# Patient Record
Sex: FEMALE | Race: WHITE | Employment: OTHER | URBAN - METROPOLITAN AREA
[De-identification: names, ages, dates, MRNs, and addresses within clinical notes are randomized per-mention and may not be internally consistent; named-entity substitution may affect disease eponyms.]

---

## 2017-01-01 ENCOUNTER — HOME CARE VISIT (OUTPATIENT)
Dept: HOSPICE | Facility: HOSPICE | Age: 82
End: 2017-01-01
Payer: MEDICARE

## 2017-01-01 ENCOUNTER — HOSPITAL ENCOUNTER (INPATIENT)
Age: 82
LOS: 1 days | DRG: 190 | End: 2017-02-05
Attending: INTERNAL MEDICINE | Admitting: INTERNAL MEDICINE
Payer: OTHER MISCELLANEOUS

## 2017-01-01 ENCOUNTER — HOME CARE VISIT (OUTPATIENT)
Dept: SCHEDULING | Facility: HOME HEALTH | Age: 82
End: 2017-01-01
Payer: MEDICARE

## 2017-01-01 VITALS
OXYGEN SATURATION: 96 % | HEART RATE: 90 BPM | DIASTOLIC BLOOD PRESSURE: 73 MMHG | RESPIRATION RATE: 24 BRPM | TEMPERATURE: 98.2 F | SYSTOLIC BLOOD PRESSURE: 139 MMHG

## 2017-01-01 VITALS
DIASTOLIC BLOOD PRESSURE: 60 MMHG | OXYGEN SATURATION: 96 % | SYSTOLIC BLOOD PRESSURE: 120 MMHG | HEART RATE: 76 BPM | RESPIRATION RATE: 26 BRPM

## 2017-01-01 VITALS
DIASTOLIC BLOOD PRESSURE: 74 MMHG | OXYGEN SATURATION: 98 % | RESPIRATION RATE: 32 BRPM | HEART RATE: 108 BPM | SYSTOLIC BLOOD PRESSURE: 114 MMHG

## 2017-01-01 VITALS
HEART RATE: 96 BPM | DIASTOLIC BLOOD PRESSURE: 80 MMHG | SYSTOLIC BLOOD PRESSURE: 140 MMHG | RESPIRATION RATE: 26 BRPM | OXYGEN SATURATION: 96 %

## 2017-01-01 VITALS — SYSTOLIC BLOOD PRESSURE: 138 MMHG | HEART RATE: 78 BPM | DIASTOLIC BLOOD PRESSURE: 72 MMHG | OXYGEN SATURATION: 98 %

## 2017-01-01 VITALS
RESPIRATION RATE: 28 BRPM | DIASTOLIC BLOOD PRESSURE: 72 MMHG | HEART RATE: 81 BPM | OXYGEN SATURATION: 98 % | SYSTOLIC BLOOD PRESSURE: 140 MMHG

## 2017-01-01 PROCEDURE — G0300 HHS/HOSPICE OF LPN EA 15 MIN: HCPCS

## 2017-01-01 PROCEDURE — 0651 HSPC ROUTINE HOME CARE

## 2017-01-01 PROCEDURE — 74011000250 HC RX REV CODE- 250: Performed by: INTERNAL MEDICINE

## 2017-01-01 PROCEDURE — 65270000029 HC RM PRIVATE

## 2017-01-01 PROCEDURE — 77030029684 HC NEB SM VOL KT MONA -A

## 2017-01-01 PROCEDURE — 74011250636 HC RX REV CODE- 250/636: Performed by: NURSE PRACTITIONER

## 2017-01-01 PROCEDURE — 74011250636 HC RX REV CODE- 250/636: Performed by: INTERNAL MEDICINE

## 2017-01-01 PROCEDURE — HOSPICE MEDICATION HC HH HOSPICE MEDICATION

## 2017-01-01 PROCEDURE — G0299 HHS/HOSPICE OF RN EA 15 MIN: HCPCS

## 2017-01-01 PROCEDURE — 74011000258 HC RX REV CODE- 258: Performed by: INTERNAL MEDICINE

## 2017-01-01 PROCEDURE — 0656 HSPC GENERAL INPATIENT

## 2017-01-01 PROCEDURE — 94640 AIRWAY INHALATION TREATMENT: CPT

## 2017-01-01 PROCEDURE — 77010033678 HC OXYGEN DAILY

## 2017-01-01 RX ORDER — LORAZEPAM 2 MG/ML
1 INJECTION INTRAMUSCULAR EVERY 6 HOURS
Status: DISCONTINUED | OUTPATIENT
Start: 2017-01-01 | End: 2017-02-06 | Stop reason: HOSPADM

## 2017-01-01 RX ORDER — LORAZEPAM 2 MG/ML
0.5 INJECTION INTRAMUSCULAR
Status: DISCONTINUED | OUTPATIENT
Start: 2017-01-01 | End: 2017-02-06 | Stop reason: HOSPADM

## 2017-01-01 RX ORDER — FUROSEMIDE 10 MG/ML
10 INJECTION INTRAMUSCULAR; INTRAVENOUS ONCE
Status: COMPLETED | OUTPATIENT
Start: 2017-01-01 | End: 2017-01-01

## 2017-01-01 RX ORDER — DEXTROSE MONOHYDRATE AND SODIUM CHLORIDE 5; .45 G/100ML; G/100ML
25 INJECTION, SOLUTION INTRAVENOUS CONTINUOUS
Status: DISCONTINUED | OUTPATIENT
Start: 2017-01-01 | End: 2017-02-06 | Stop reason: HOSPADM

## 2017-01-01 RX ORDER — MORPHINE SULFATE 2 MG/ML
2 INJECTION, SOLUTION INTRAMUSCULAR; INTRAVENOUS
Status: DISCONTINUED | OUTPATIENT
Start: 2017-01-01 | End: 2017-02-06 | Stop reason: HOSPADM

## 2017-01-01 RX ORDER — DEXAMETHASONE SODIUM PHOSPHATE 4 MG/ML
10 INJECTION, SOLUTION INTRA-ARTICULAR; INTRALESIONAL; INTRAMUSCULAR; INTRAVENOUS; SOFT TISSUE DAILY
Status: DISCONTINUED | OUTPATIENT
Start: 2017-01-01 | End: 2017-02-06 | Stop reason: HOSPADM

## 2017-01-01 RX ORDER — MORPHINE SULFATE 2 MG/ML
1 INJECTION, SOLUTION INTRAMUSCULAR; INTRAVENOUS EVERY 4 HOURS
Status: DISCONTINUED | OUTPATIENT
Start: 2017-01-01 | End: 2017-02-06 | Stop reason: HOSPADM

## 2017-01-01 RX ORDER — AMOXICILLIN 250 MG
2 CAPSULE ORAL
Status: DISCONTINUED | OUTPATIENT
Start: 2017-01-01 | End: 2017-02-06 | Stop reason: HOSPADM

## 2017-01-01 RX ORDER — ALBUTEROL SULFATE 0.83 MG/ML
2.5 SOLUTION RESPIRATORY (INHALATION)
Status: DISCONTINUED | OUTPATIENT
Start: 2017-01-01 | End: 2017-02-06 | Stop reason: HOSPADM

## 2017-01-01 RX ORDER — ONDANSETRON 2 MG/ML
4 INJECTION INTRAMUSCULAR; INTRAVENOUS
Status: DISCONTINUED | OUTPATIENT
Start: 2017-01-01 | End: 2017-02-06 | Stop reason: HOSPADM

## 2017-01-01 RX ADMIN — ALBUTEROL SULFATE 3 ML: 2.5 SOLUTION RESPIRATORY (INHALATION) at 11:00

## 2017-01-01 RX ADMIN — ALBUTEROL SULFATE 2.5 MG: 2.5 SOLUTION RESPIRATORY (INHALATION) at 03:00

## 2017-01-01 RX ADMIN — LORAZEPAM 0.5 MG: 2 INJECTION INTRAMUSCULAR; INTRAVENOUS at 07:10

## 2017-01-01 RX ADMIN — ALBUTEROL SULFATE 2.5 MG: 2.5 SOLUTION RESPIRATORY (INHALATION) at 20:41

## 2017-01-01 RX ADMIN — LORAZEPAM 0.5 MG: 2 INJECTION INTRAMUSCULAR; INTRAVENOUS at 11:17

## 2017-01-01 RX ADMIN — DEXTROSE MONOHYDRATE AND SODIUM CHLORIDE 25 ML/HR: 5; .45 INJECTION, SOLUTION INTRAVENOUS at 15:34

## 2017-01-01 RX ADMIN — DEXAMETHASONE SODIUM PHOSPHATE 10 MG: 4 INJECTION, SOLUTION INTRAMUSCULAR; INTRAVENOUS at 16:36

## 2017-01-01 RX ADMIN — DEXAMETHASONE SODIUM PHOSPHATE 10 MG: 4 INJECTION, SOLUTION INTRAMUSCULAR; INTRAVENOUS at 09:00

## 2017-01-01 RX ADMIN — ONDANSETRON 4 MG: 2 INJECTION INTRAMUSCULAR; INTRAVENOUS at 10:35

## 2017-01-01 RX ADMIN — FUROSEMIDE 10 MG: 10 INJECTION, SOLUTION INTRAMUSCULAR; INTRAVENOUS at 15:44

## 2017-01-01 RX ADMIN — LORAZEPAM 0.5 MG: 2 INJECTION INTRAMUSCULAR; INTRAVENOUS at 00:38

## 2017-01-01 RX ADMIN — ALBUTEROL SULFATE 2.5 MG: 2.5 SOLUTION RESPIRATORY (INHALATION) at 16:40

## 2017-01-01 RX ADMIN — ALBUTEROL SULFATE 3 ML: 2.5 SOLUTION RESPIRATORY (INHALATION) at 11:30

## 2017-01-01 RX ADMIN — ALBUTEROL SULFATE 2.5 MG: 2.5 SOLUTION RESPIRATORY (INHALATION) at 07:33

## 2017-01-01 RX ADMIN — LORAZEPAM 0.5 MG: 2 INJECTION INTRAMUSCULAR; INTRAVENOUS at 16:36

## 2017-01-01 RX ADMIN — ALBUTEROL SULFATE 2.5 MG: 2.5 SOLUTION RESPIRATORY (INHALATION) at 14:53

## 2017-01-01 RX ADMIN — LORAZEPAM 0.5 MG: 2 INJECTION INTRAMUSCULAR; INTRAVENOUS at 13:58

## 2017-01-01 RX ADMIN — LORAZEPAM 0.5 MG: 0.5 TABLET ORAL at 11:30

## 2017-01-01 RX ADMIN — LORAZEPAM 0.5 MG: 2 INJECTION INTRAMUSCULAR; INTRAVENOUS at 19:26

## 2017-01-01 RX ADMIN — Medication 1 MG: at 19:26

## 2017-01-01 RX ADMIN — Medication 2 MG: at 13:58

## 2017-02-04 PROBLEM — J44.9 END STAGE CHRONIC OBSTRUCTIVE PULMONARY DISEASE (HCC): Status: ACTIVE | Noted: 2017-01-01

## 2017-02-04 NOTE — PROGRESS NOTES
02/04/17 1444   Vital Signs   Temp 98.5 °F (36.9 °C)   Temp Source Axillary   Pulse (Heart Rate) (!) 106   Resp Rate 30   O2 Sat (%) 97 %   Level of Consciousness Alert   /86   MAP (Calculated) 103   BP 1 Method Automatic   BP 1 Location Left arm   BP Patient Position At rest   MEWS Score 4   Patient is hospice

## 2017-02-04 NOTE — HOSPICE
Pt has been a home hospice patient and is being admitted for uncontrolled symptoms as below     [place] [facility]  [phone]    GIP Daily Nursing Note   Patient Name: Ricardo Rogers  YOB: 1925  Age: 80 y.o. Date of Visit: 02/04/17  Facility of Care: [facility]  Patient Room: [facility] [place]     Hospice Attending: Mariana Kulkarni MD  Hospice Diagnosis: COPD   End stage chronic obstructive pulmonary disease ([facility]    Level of Care: GIP  GIP Symptoms: Uncontrolled dyspnea/pulmonary congestion    Current GIP Symptoms    1. RR 32 when awake and 16 when sleeping  Use of all accessory muscles with shoulders coming up to ears during inspiration when awake  Respirations easy when sleeping    2. Irritating congested  NPC   IV Fluids D5 1/2 NS at 25 ml / hr   Decadron 10 mg IV daily   Lasix 10 mg daily  Albuterol nebulizer tx q 6 hours  3. Anxiety related to the above  Lorazepam 0.5 mg IV q 1 hr prn  4. Dyspnea   O2 3lpm nc continuously and BiPap prn          ASSESSMENT & PLAN   Must update Plan of Care including visit frequencies for IDT members  1. See above         Spiritual Interventions: None needed at this time    Psych/ Social/ Emotional Interventions: Support given to PCG Jenni Burnham and tc to [email or web address] at [phone] to advise of above    Care Coordination Needs: TC made to MD on call for Dr. Zofia Camarillo to advise of change in condition and order received to admit to [facility] inpatient hospice level of care  Coordinated and received orders from Dr. Bradley Alanis and Barry N Ivana Pederson and New Orders discussed / approved with above    Description History and Chart Review   If this is initial GIP note must document RN assessment/MD communication in previous setting.  Specifically document nursing/medication needs in last 24 hours to support GIP care  Narrative History of last 24 hours that demonstrates care cannot be provided in another setting: Patient with increased respiratory distress yesterday following 3 days of symptoms of virus with  nausea/emesis and diarrhea    Last loose stool and emesis was 2/3 at 1000. Increased respiratory distress with SN visits yesterday to manage symptoms. Morphine and Lorazepam had been given. This RN visited home this am at 21  as caregiver reported marked lethargy with continued shortness of breath. Pt received lethargic, responsive, /74  P 108  RR 32  O2 Sat 98% on O2 at 3lpm nc. Pulmonary congestion audible at the bedside with irritating NPC. Rales audible in bases. Bowel sounds hyperactive, pedal pulses good, legs distal to knees discolored due to cardiac disease. Legs hanging off side of bed. Repositioned in bed, Albuterol nebulizer tx given, Lorazepam 0.5 mg given sl. Pt went to sleep with respirations easy when sleeping on BiPap and yet when aroused, increased respiratory effort and said she was very short of breath. Pt too lethargic to administer PO meds she had been taking such as Prednisone Furosemide, and Coreg  Discussed with JIMENEZ Flores and order received to admit to [facility] inpatient hospice level of care as patient requiring IV fluids and IV meds of Decadron and Furosemide. What has been done to control the patient's symptoms in the last 24 hours? Morphine 40 mg liquid, Lorazepam 2 mg liquid, Albuterol nebulizer tx x 3, BiPap at times as tolerated    Does the patient currently require IV medications? Yes  Does the patient currently require scheduled medications? No  Does the patient currently require a PCA?  No    List number of doses of PRN medications in last 24 hours:  Medication 1:  Morphine 20 mg po  Number of doses:  2    Medication 2: Lorazepam 0.5 mg po  Number of doses: 4    Medication 3:   Albuterol tx   Number of doses:  3    Supporting documentation for GIP need for pain control:  [x] Frequent evaluation by a doctor, nurse practitioner, nurse   [x] Frequent medication adjustment [x] IVs that cannot be administered at home   [] Aggressive pain management   [x] Complicated technical delivery of medications              Supporting documentation for GIP need for symptom control:  [x]  Sudden decline necessitating intensive nursing intervention  []  Uncontrolled / intractable nausea or vomiting   []  Pathological fractures  []  Advanced open wounds requiring frequent skilled care  [x] Unmanageable respiratory distress  [] New or worsening delirium   [] Delirium with behavior issues: Is 24 hour caregiver present due to safety concerns with agitation? (yes/no)  [] Imminent death  with skilled nursing needs documented above    DISCHARGE PLANNING   Daily discharge planning required for GIP  1. Discharge Plan: Home with PCG   2. Patient/Family teaching: Review of Discharge planning including medications  3.  Response to patient/family teaching: MPOA and PCG Arvel Bound understand patient will be returned home after hospital stay    ASSESSMENT    KARNOFSKY: 30    Prognosis estimated based on 02/04/17 clinical assessment is:   [] Few to Many Hours  [] Hours to Days   [] Few to Many Days   [] Days to Weeks   [] Few to Many Weeks   [] Weeks to Months   [] Few to Many Months    Quality Measure: Patient self-reports:  [x] Yes    [] No    ESAS:   Time of Assessment: 2:30 pm  Pain (1-10):0 per patient  Fatigue (1-10):   Shortness of breath (1-10):6  Constipation: _ Yes  x_ No  LAST BM: 2/3/17    CLINICAL INFORMATION   Patient Vitals for the past 12 hrs:   Temp Pulse Resp BP SpO2   02/04/17 1444 98.5 °F (36.9 °C) (!) 106 30 136/86 97 %       Currently this patient has:  [x] Supplemental O2   [x] IV    [x] Other: BiPap    SIGNS/PHYSICAL FINDINGS     Skin (including wound):  [x] Warm   [x] Dry     Sacrum very dark pink blanchable      Turgor [x] Decreased  Color:   [] Pink   [x] Pale     []  Wounds:    Neuro:  Alert and oriented  [x] Lethargy      Cardiac:  [x] Dyspnea on Exertion  [] JVD  [x] Murmur  [x] Tachycardia      Respiratory:  Breath sounds:     [x] Rhonchi   [x] Rales   [x] Labored:   While awake         [x] Cough   [x] Non Productive         [x] O2 at 3___ LPM  [][x] Bi-Pap    GI  [x] Abdomen  Rounded soft and non tender  [x] Last BM 2/3/17    Nutrition  Diet:__soft________  Appetite:   [x] Poor         [x] Voiding  [x] Incontinent   [] Noel    Musculoskeletal  [] Balance/[place] Unsteady   [x] Weak   Strength:    [] Normal    [] Limited    [x] Decreasing   Activities:    [] Up as tolerated   [x] Bedridden    [] Specify:    SAFETY  [] 24 hr. Caregiver   [x] Side rails ?     [x] Hospital bed   [x] Reviewed Falls & Safety       ALLERGIES AND MEDICATIONS     Allergies: No Known Allergies    Current Facility-Administered Medications   Medication Dose Route Frequency    ondansetron (ZOFRAN) injection 4 mg  4 mg IntraVENous Q4H PRN    senna-docusate (PERICOLACE) 8.6-50 mg per tablet 2 Tab  2 Tab Oral BID PRN    albuterol (PROVENTIL VENTOLIN) nebulizer solution 2.5 mg  2.5 mg Nebulization Q6H RT    dexamethasone (DECADRON) 4 mg/mL injection 10 mg  10 mg IntraVENous DAILY    morphine injection 2 mg  2 mg IntraVENous Q1H PRN    LORazepam (ATIVAN) injection 0.5 mg  0.5 mg IntraVENous Q1H PRN    furosemide (LASIX) injection 10 mg  10 mg IntraVENous ONCE    dextrose 5 % - 0.45% NaCl infusion  25 mL/hr IntraVENous CONTINUOUS          Visit Time In:   2:15 pm  Visit Time Out: 4:00 pm

## 2017-02-05 NOTE — PROGRESS NOTES
[address] [place] [address] [facility]  [phone]    Patient Name: Asha Alfaro  YOB: 1925    Date of Provider Hospice Visit: 02/05/17    Level of Care:   [x] General Inpatient (GIP)    [] Routine   [] Respite    Location of Care:  [x] [facility] [place] [] [facility] [] [facility] [] [facility] [] Hospice [facility]  [] Home [] Other:      Date of Original Hospice Admission: 2-4-17  Hospice Attending: [unfilled]    Principle Hospice Diagnosis: end stage COPD  Diagnoses RELATED to the terminal prognosis:  Acute respiratory failure  Other Diagnoses: acute renal failure, CHF, CVA malignant neoplasm of transverse colon     HOSPICE NARRATIVE COMPOSED BY PHYSICIAN   Rationale for a prognosis of life expectancy of 6 months or less if the disease follows its normal course: Asha Alfaro is a 80y.o. year old who was admitted to [facility]. The patient's principle diagnosis of end stage COPD, has resulted in acute respiratory failure, intractable nausea and vomiting which may have been precipitated by a virus (caregivers had a stomach virus) Functionally, the patient's Palliative Performance Scale has declined over a period of several days and is estimated at 10%. Objective information that support this patients limited prognosis includes: acute respiratory failure, using trilogy and supplemental ventimask. The patient/family chose comfort measures with the support of Hospice. HOSPICE DIAGNOSES   Active Symptoms:  1. Respiratory failure  2. Tachypnea  3. Severe shortness of breath  4. Use of accessory muscles  5. Altered mental status  6. Intractable nausea and vomiting     PLAN   1. Use trilogy as needed for comfort   2. May use venti mask for comfort  3. Scheduled ativan 1mg q 6hrs and prn  4. Scheduled morphine 1mg q 4hrs and prn  5. Continue Zofran as needed  6.  and SW to support family needs  7. Disposition: [facility] for now, home when symptoms are relieved  8.   Continue decadron and nebulizers      Prognosis estimated based on 02/05/17 clinical assessment is:   [x] Few to Many Hours  [] Few to Many Days    [] Few to Many Weeks    [] Few to Many Months    Communicated plan of care with: Hospice Case Manager; Hospice IDT; Care Team     GOALS OF CARE     Resuscitation Status: DNR  Durable DNR: [x] Yes [] No    Advance Care Planning 9/15/2015   Patient's Healthcare Decision Maker is: Named in scanned ACP document   Primary Decision Maker Name [address]   Primary Decision Maker Phone Number [phone]   Primary Decision Maker Relationship to Patient Friend   Secondary Decision Maker Name Dmitri Cortes.  [address] [facility] Phone Number see chart   Secondary Decision Maker Relationship to Patient Other relative   Confirm Advance Directive Yes, on file   Does the patient have other document types Power of         HISTORY     History obtained from: chart, nursing    CHIEF COMPLAINT: unresponsive, acute respiratory failure  The patient is:   [] Verbal  [] Nonverbal  [x] Unresponsive    HPI/SUBJECTIVE:  Pt with end stage COPD, respiratory failure and altered mental status       REVIEW OF SYSTEMS     The following systems were: [] reviewed  [x] unable to be reviewed    Positive ROS include:  Constitutional:  Ears/nose/mouth/throat:  Respiratory:  Gastrointestinal:  Musculoskeletal:  Neurologic:  Psychiatric:  Endocrine:     Adult Non-Verbal Pain Assessment Score: 6/10    Face  [] 0   No particular expression or smile  [] 1   Occasional grimace, tearing, frowning, wrinkled forehead  [x] 2   Frequent grimace, tearing, frowning, wrinkled forehead    Activity (movement)  [] 0   Lying quietly, normal position  [] 1   Seeking attention through movement or slow, cautious movement  [x] 2   Restless, excessive activity and/or withdrawal reflexes    Guarding  [] 0   Lying quietly, no positioning of hands over areas of body  [] 1   Splinting areas of the body, tense  [x] 2   Rigid, stiff    Physiology (vital signs)  [] 0   Stable vital signs  [] 1   Change in any of the following: SBP > 20mm Hg; HR > 20/minute  [] 2   Change in any of the following: SBP > 30mm Hg; HR > 25/minute    Respiratory  [] 0   Baseline RR/SpO2, compliant with ventilator  [] 1   RR > 10 above baseline, or 5% drop SpO2, mild asynchrony with ventilator  [] 2   RR > 20 above baseline, or 10% drop SpO2, asynchrony with ventilator     FUNCTIONAL ASSESSMENT     Palliative Performance Scale (PPS): 10%     PSYCHOSOCIAL/SPIRITUAL ASSESSMENT     Active Problems:    End stage chronic obstructive pulmonary disease ([facility] (2/4/2017)      Past Medical History   Diagnosis Date    Acute combined systolic and diastolic CHF, NYHA class 3 ([facility] 3/10/2014     TTE 3/9/14 LVEF 25-30 %. Hypo mid-apical anterior, mid-apical inferior, apical septal, and apical wall(s). Mild LVH, DD.  RV size  upper limits of normal. Systolic function moderately reduced. VIRGEN. Mod MR. Mod AoSt. P.I.G./mean 30/15. Mild to mod AR. Mild TR, moderate to severe PHTN. RVSP at around 60 mm/hg. Inferior vena cava was moderately dilated.  Respirophasic changes in dimension were abse    Asthma     Atrial fibrillation (HCC)     Chronic kidney disease     Colon cancer ([facility] 6/29/10     rt colon ca    Congestive heart failure, unspecified (HCC)     COPD (chronic obstructive pulmonary disease) (HCC)     Cyst of breast     Hypertension     Osteoarthritis     Pneumonia, organism unspecified 3/8/2014    [facility]     Thyroid disease     Vertigo       Past Surgical History   Procedure Laterality Date    Pr lap,surg,colectomy,w/remvl term ileum  7/1/10     BC: rt colon ca    Hx heent       partial thyroidectomy    Hx heent       R cataract removal    Hx appendectomy      Hx gyn       hysterectomy    Hx urological       bladder tacked up      Social History   Substance Use Topics    Smoking status: Never Smoker    Smokeless tobacco: Never Used   Gwen Blake Alcohol use No     Family History   Problem Relation Age of Onset    Stroke Mother       No Known Allergies   Current Facility-Administered Medications   Medication Dose Route Frequency    ondansetron (ZOFRAN) injection 4 mg  4 mg IntraVENous Q4H PRN    senna-docusate (PERICOLACE) 8.6-50 mg per tablet 2 Tab  2 Tab Oral BID PRN    albuterol (PROVENTIL VENTOLIN) nebulizer solution 2.5 mg  2.5 mg Nebulization Q6H RT    dexamethasone (DECADRON) 4 mg/mL injection 10 mg  10 mg IntraVENous DAILY    morphine injection 2 mg  2 mg IntraVENous Q1H PRN    LORazepam (ATIVAN) injection 0.5 mg  0.5 mg IntraVENous Q1H PRN    dextrose 5 % - 0.45% NaCl infusion  25 mL/hr IntraVENous CONTINUOUS        PHYSICAL EXAM     Wt Readings from Last 3 Encounters:   09/17/15 47.7 kg (105 lb 3.2 oz)   05/08/15 49.4 kg (109 lb)   07/18/14 51.5 kg (113 lb 9.6 oz)       Visit Vitals    /73 (BP 1 Location: Left arm, BP Patient Position: At rest)    Pulse 90    Temp 98.2 °F (36.8 °C)    Resp 24    SpO2 96%       Supplemental O2  [x] Yes  [] NO  Last bowel movement:  Prior to admission    Currently this patient has:  [x] Peripheral IV [] PICC  [] PORT [] ICD    [x] Noel Catheter [] NG Tube   [] PEG Tube    [] Rectal Tube [] Drain  [] Other:     Constitutional: pt is minimally responsive  Eyes: clear   ENMT: clear   Cardiovascular: HRRR  Respiratory: severe shortness of breath  Gastrointestinal: bs+  Musculoskeletal: weakness noted  Skin: warm, dry  Neurologic: calm, minimally responsive  Psychiatric: calm  Other:    Pertinent Lab and or Imaging Tests:  Lab Results   Component Value Date/Time    Sodium 133 09/17/2015 03:29 AM    Potassium 4.5 09/17/2015 03:29 AM    Chloride 95 09/17/2015 03:29 AM    CO2 35 09/17/2015 03:29 AM    Anion gap 3 09/17/2015 03:29 AM    Glucose 101 09/17/2015 03:29 AM    BUN 28 09/17/2015 03:29 AM    Creatinine 1.16 09/17/2015 03:29 AM    BUN/Creatinine ratio 24 09/17/2015 03:29 AM    GFR est AA 53 09/17/2015 03:29 AM    GFR est non-AA 44 09/17/2015 03:29 AM    Calcium 7.9 09/17/2015 03:29 AM     Lab Results   Component Value Date/Time    Protein, total 5.6 09/17/2015 03:29 AM    Albumin 2.6 09/17/2015 03:29 AM           Total time: [address], NP          This patient meets [facility] General Inpatient (GIP) Level of Care.     The precipitating event that resulted in the need for GIP was: altered mental status and acute respiratory failure with caregivers unable to administer medications    The interventions tried that have been unsuccessful at controlling symptoms include: oral medications administered at home    Supporting documentation for GIP need for pain control:  [x] Frequent evaluation by a doctor, nurse practitioner, nurse   [x] Frequent medication adjustment    [x] IVs that cannot be administered at home   [] Aggressive pain management   [] Complicated technical delivery of medications              Supporting documentation for GIP need for symptom control:  [x]  Sudden decline necessitating intensive nursing intervention  [x]  Uncontrolled / intractable nausea or vomiting   []  Pathological fractures  []  Advanced open wounds requiring frequent skilled care  [x] Unmanageable respiratory distress  [] New or worsening delirium   [] Delirium with behavior issues  [] Imminent death  with skilled nursing needs documented above

## 2017-02-05 NOTE — PROGRESS NOTES
02/05/17 0911   Vital Signs   Temp 98.2 °F (36.8 °C)   Temp Source Axillary   Pulse (Heart Rate) 90   Resp Rate 24   O2 Sat (%) 96 %   Level of Consciousness Responds to Voice   /73   MAP (Calculated) 95   BP 1 Method Automatic   BP 1 Location Left arm   BP Patient Position At rest   MEWS Score 3   patient is hospice

## 2017-02-05 NOTE — PROGRESS NOTES
Bedside shift change report given to Dimitri Garza RN (oncoming nurse) by ST CEE RN (offgoing nurse). Report included the following information SBAR, Kardex and MAR.

## 2017-02-05 NOTE — HOSPICE
[facility]  [phone]    GIP Daily Nursing Note   Patient Name: Marbella Bello  YOB: 1925  Age: 80 y.o. Date of Visit: 02/05/17  Facility of Care: [facility]  Patient Room: 216/01     Hospice Attending: Jonna Markham MD  Hospice Diagnosis: COPD   End stage chronic obstructive pulmonary disease ([facility] [address] [facility]    Level of Care: GIP  GIP Symptoms:     Current GIP Symptoms    1. Respiratory failure  2. Tachypnea  3. Altered mental status    ASSESSMENT & PLAN   Must update Plan of Care including visit frequencies for IDT members  1. Respiratory failure- oxygen saturation 96% on 3 Lnc; Triology used at night  2. Tachypnea- improved on prn Ativan  3. Altered mental status: patient is minimally unresponsive    Spiritual Interventions: Caregiver at bedside. Family arriving from out of town. Hospice chaplain will visit tomorrow    Psych/ Social/ Emotional Interventions:  Supportive conversations with caregiver. Liaison spoke with MPOA by phone. Care Coordination Needs: Discussed with primary RN, Sundar Coates, Hospice RN and IDT. Care plan and New Orders discussed / approved with Shannon Carrillo    Description History and Chart Review   If this is initial GIP note must document RN assessment/MD communication in previous setting. Specifically document nursing/medication needs in last 24 hours to support GIP care  Narrative History of last 24 hours that demonstrates care cannot be provided in another setting:  Marbella Bello is a 80y.o. year old who was admitted to [facility]. The patient's principle diagnosis of end stage COPD, has resulted in acute respiratory failure, intractable nausea and vomiting which may have been precipitated by a virus (caregivers had a stomach virus) Functionally, the patient's Palliative Performance Scale has declined over a period of several days and is estimated at 10%.  Objective information that support this patients limited prognosis includes: acute respiratory failure, using trilogy and supplemental ventimask. The patient/family chose comfort measures with the support of Hospice in November 2015. Patient had been doing will at home when the hospice RN went to do a home visit.  Pt received lethargic, responsive, /74 P 108 RR 32 O2 Sat 98% on O2 at 3lpm nc. Pulmonary congestion audible at the bedside with irritating NPC. Rales audible in bases. Bowel sounds hyperactive, pedal pulses good, legs distal to knees discolored due to cardiac disease. Legs hanging off side of bed. Repositioned in bed, Albuterol nebulizer tx given, Lorazepam 0.5 mg given sl. Pt went to sleep with respirations easy when sleeping on BiPap and yet when aroused, increased respiratory effort and said she was very short of breath. Pt too lethargic to administer PO meds she had been taking such as Prednisone Furosemide, and Coreg Discussed with JIMENEZ Yeung and order received to admit to [facility] inpatient hospice level of care as patient requiring IV fluids and IV meds of Decadron and Furosemide.      What has been done to control the patient's symptoms in the last 24 hours? Patient place on scheduled  Morphine 1 mg every 4 hours, Ativan 1 mg every 6 hours. She is receiving Zofran as neede  Morphine 40 mg liquid, Lorazepam 2 mg liquid, Albuterol nebulizer tx x 3, BiPap at times as tolerated         Does the patient currently require IV medications? Yes  Does the patient currently require scheduled medications? Yes  Does the patient currently require a PCA?  no    List number of doses of PRN medications in last 24 hours:  Medication 1: Zofran 4 mg  Number of doses: 1    Medication 2: /ativan 0.5 mg  Number of doses: 3    Medication 3:   Number of doses:    Supporting documentation for [facility] need for pain control:  [x] Frequent evaluation by a doctor, nurse practitioner, nurse   [x] Frequent medication adjustment    [x] IVs that cannot be administered at home   [] Aggressive pain management   [] Complicated technical delivery of medications              Supporting documentation for GIP need for symptom control:  [x]  Sudden decline necessitating intensive nursing intervention  []  Uncontrolled / intractable nausea or vomiting   []  Pathological fractures  []  Advanced open wounds requiring frequent skilled care  [] Unmanageable respiratory distress  [] New or worsening delirium   [] Delirium with behavior issues: Is 24 hour caregiver present due to safety concerns with agitation? (yes/no)  [] Imminent death  with skilled nursing needs documented above    DISCHARGE PLANNING   Daily discharge planning required for GIP  1. Discharge Plan: Anticipate patient will die in hospital. If stable will return home with hospice and paid caregivers  2. Patient/Family teaching: end of life symptoms  3. Response to patient/family teaching: receptive    ASSESSMENT    KARNOFSKY: 20-30    Prognosis estimated based on 02/05/17 clinical assessment is:   [] Few to Many Hours  [x] Hours to Days   [] Few to Many Days   [] Days to Weeks   [] Few to Many Weeks   [] Weeks to Months   [] Few to Many Months    Quality Measure: Patient self-reports:  [] Yes    [x] No    ESAS:   Time of Assessment: 1500  Pain (1-10): 3  Fatigue (1-10): 10  Shortness of breath (1-10):5  Nausea (1-10): 0  Appetite (1-10):    Anxiety: (1-10):   Depression: (1-10):   Well-being: (1-10):   Constipation: _ Yes  _ No  LAST BM: prior to admission yesterday    CLINICAL INFORMATION   Patient Vitals for the past 12 hrs:   Temp Pulse Resp BP SpO2   02/05/17 0911 98.2 °F (36.8 °C) 90 24 139/73 96 %   02/05/17 0735 - - - - 93 %       Currently this patient has:  [x] Supplemental O2   [x] IV    [] PICC      [] PORT   [] NG Tube    [] PEG Tube   [] Ostomy     [x] Noel draining  yellow urine  [] Other:     SIGNS/PHYSICAL FINDINGS     Skin (including wound):  [x] Warm, dry, supple, intact and color normal for race  [] Warm   [] Dry   [] Cool     [] Clammy       [] Diaphoretic    Turgor   [] Normal   [x] Decreased  Color:   [] Pink   [x] Pale   [] Cyanotic   [] Erythema   [] Jaundice   [] Normal for Race  []  Wounds:    Neuro:  [x] Lethargy  [] Restlessness / agitation  [] Confusion / delirium  [] Hallucinations  [] Responds to maximal stimulation  [x] Unresponsive  [] Seizures     Cardiac:  [] Dyspnea on Exertion  [] JVD  [] Murmur  [] Palpitations  [] Hypotension  [] Hypertension  [] Tachycardia  [] Bradycardia  [] Irregular HR  [] Pulses Decreased  [] Pulses Absent  [] Edema:       (Location, Grade and Pitting)  [] Mottling:      (Location)    Respiratory:  Breath sounds:    [x] Diminished   [] Wheeze   [x] Rhonchi   [] Rales   [] Even and unlabored  [x] Labored: mild use of accessory muscles but improved after Ativan   [] Cough   [] Non Productive   [] Productive    [] Description:           [] Deep suctioned   [x] O2 at  3  LPM  [] High flow oxygen greater than 10 LPM  [] Bi-Pap    GI  [] Abdomen (describe)   [] Ascites  [] Nausea  [] Vomiting  [] Incontinent of bowels  [] Bowel sounds (yes/no)  [] Diarrhea  [] Constipation (see above including last bowel movement)  [] Checked for impaction  [] Last BM prior to admission yesterday. Nutrition  Diet: NPO  Appetite:   [] Good   [] Fair   [] Poor   [] Tube Feeding       [] Voiding  [] Incontinent   [x] Noel    Musculoskeletal  [] Balance/[place] Unsteady   [] Weak   Strength:    [] Normal    [] Limited    [x] Decreasing   Activities:    [] Up as tolerated   [x] Bedridden    [] Specify:    SAFETY  [] 24 hr. Caregiver   [x] Side rails ?     [x] Hospital bed   [] Reviewed Falls & Safety       ALLERGIES AND MEDICATIONS     Allergies: No Known Allergies    Current Facility-Administered Medications   Medication Dose Route Frequency    morphine injection 1 mg  1 mg IntraVENous Q4H    LORazepam (ATIVAN) injection 1 mg  1 mg IntraVENous Q6H    ondansetron (ZOFRAN) injection 4 mg  4 mg IntraVENous Q4H PRN    senna-docusate (PERICOLACE) 8.6-50 mg per tablet 2 Tab  2 Tab Oral BID PRN    albuterol (PROVENTIL VENTOLIN) nebulizer solution 2.5 mg  2.5 mg Nebulization Q6H RT    dexamethasone (DECADRON) 4 mg/mL injection 10 mg  10 mg IntraVENous DAILY    morphine injection 2 mg  2 mg IntraVENous Q1H PRN    LORazepam (ATIVAN) injection 0.5 mg  0.5 mg IntraVENous Q1H PRN    dextrose 5 % - 0.45% NaCl infusion  25 mL/hr IntraVENous CONTINUOUS          Visit Time In: 1500  Visit Time Out: 8389

## 2017-02-05 NOTE — PROGRESS NOTES
Bedside shift change report given to Juan Francisco Womack (oncoming nurse) by Maritza Castrejon (offgoing nurse). Report included the following information SBAR and Kardex.

## 2017-02-06 NOTE — PROGRESS NOTES
Provided support to patient's friends at time of patient death. Helped friends navigate complicate family dynamics. Friends are grieving appropriately and use christopher as a coping resource. Chaplain Grace Dias M.Div.    Paging Service [phone])

## 2017-02-06 NOTE — PROGRESS NOTES
Called to examine patient who has . No response to verbal and tactile stimuli. No respiratory effort. Absent heart sounds and pulses. Pupils fixed and dilated. Patient pronounced dead at 20:15 PM hours.

## 2017-02-06 NOTE — HOSPICE
[facility]  [phone]    Discharge/Death Nursing Note   Patient Name: Ricardo Rogers  YOB: 1925  Age: 80 y.o. Date of Death: 17  Admitted Date: 2017  Time of Death: 20:15    Facility of Care: [facility]  Level of Care: Routine  Patient Room: 216/01     Hospice Attending: Allison Malone Diagnosis: COPD   End stage chronic obstructive pulmonary disease ([facility] [address]    Death Pronouncement   Pronouncement of death completed by: Dr. Dorothye Sicard staff was not  present at the time of death    At the time of death the patient was documented as absence of respirations, pulseless, pupils fixed and dilated    The pt  within [facility]    The following were notified of the patient's death: patients MPOA was present. Medications were disposed of per facility protocol     Discharge Summary   Discharge Reason: Death    Summary of Care Provided:    [x] Post mortem care provided by nursing staff  [x] Notification of  home by nursing staff  [] Referrals/Community resources provided:   [x] Goals completed  [] Durable Medical Equipment vendor notified     Disciplines involved: [] RN [] SW []  [] CONLEY [] Vol [] PT [] OT [] ST [] BC    [x] IDT communication/notification    Attending Physician, Dr. Meli Warner, notified of death by notification to Hospice Triage    Bereaved   Verify bereaved identified with name, address, telephone number and risk level      Advance Care Planning 9/15/2015   Patient's Healthcare Decision Maker is: Named in scanned ACP document   Primary Decision Maker Name [address] [facility]   Primary Decision Maker Phone Number [phone]   Primary Decision Maker Relationship to Patient Friend   Secondary Decision Maker Name Neena Morrison.  [address] [facility] Phone Number see chart   Secondary Decision Maker Relationship to Patient Other relative   Confirm Advance Directive Yes, on file   Does the patient have other document types Power of [facility]

## 2017-02-06 NOTE — PROGRESS NOTES
Responded to request from nursing staff to visit with patient and friends per friend request. Patient is actively dying and two friends at bedside, comforting patient and grieving appropriately. Friends were sharing stories and encouraging the patient to relax and let go when she was ready. Practiced empathetic listening as friends practiced life review and provided insight in to family dynamics. Prayed at bedside and assured friends of ongoing support as needed and desired. Chaplain Noah Price M.Div.    Paging Service [phone])

## 2017-02-07 VITALS — RESPIRATION RATE: 32 BRPM | HEART RATE: 119 BPM | OXYGEN SATURATION: 93 %

## 2017-02-08 NOTE — DISCHARGE SUMMARY
Discharge Summary    [facility]  [facility]        Date of Admission: 2/4/2017  Date of Discharge: 2/5/2017    Johanny Combs is a 80y.o. year old who was admitted to [facility] at [address] [place] [address] with a Hospice diagnosis of COPD ;End stage chronic obstructive pulmonary disease ([facility] [address] The patient's care was focused on comfort and the patient passed away on 2/5/2017. Date of Original Hospice Admission: 2-4-17  Hospice Attending: Dr. Beba Meesk Diagnosis: end stage COPD  Diagnoses RELATED to the terminal prognosis:  Acute respiratory failure  Other Diagnoses: acute renal failure, CHF, CVA malignant neoplasm of transverse colon      HOSPICE NARRATIVE COMPOSED BY PHYSICIAN   Rationale for a prognosis of life expectancy of 6 months or less if the disease follows its normal course:      Johanny Combs is a 80y.o. year old who was admitted to [facility]. The patient's principle diagnosis of end stage COPD, has resulted in acute respiratory failure, intractable nausea and vomiting which may have been precipitated by a virus (caregivers had a stomach virus) Functionally, the patient's Palliative Performance Scale has declined over a period of several days and is estimated at 10%. Objective information that support this patients limited prognosis includes: acute respiratory failure, using trilogy and supplemental ventimask. The patient/family chose comfort measures with the support of Hospice.      HOSPICE DIAGNOSES   Active Symptoms:  1. Respiratory failure  2. Tachypnea  3. Severe shortness of breath  4. Use of accessory muscles  5. Altered mental status  6. Intractable nausea and vomiting      PLAN   1. Use trilogy as needed for comfort   2. May use venti mask for comfort  3. Scheduled ativan 1mg q 6hrs and prn  4. Scheduled morphine 1mg q 4hrs and prn  5. Continue Zofran as needed  6.  and SW to support family needs  7.  Disposition: [facility] for now, home when symptoms are relieved  8.  Continue decadron and nebulizers

## 2017-02-08 NOTE — H&P
This is note from NP [facility] on 17. Patient  before MD could do H&P. [address] [facility]  [phone]     Patient Name: Jyotsna Morales  YOB: 1925     Date of Provider Hospice Visit: 17     Level of Care: [x] General Inpatient (GIP)  [] Routine   [] Respite     Location of Care:  [x] [facility] [] [facility] [] [facility] [] [facility] [] [facility]  [] Home [] Other:      Date of Original Hospice Admission: 17  Hospice Attending: [unfilled]     Principle Hospice Diagnosis: end stage COPD  Diagnoses RELATED to the terminal prognosis:  Acute respiratory failure  Other Diagnoses: acute renal failure, CHF, CVA malignant neoplasm of transverse colon     HOSPICE NARRATIVE COMPOSED BY PHYSICIAN   Rationale for a prognosis of life expectancy of 6 months or less if the disease follows its normal course:     Jyotsna Morales is a 80y.o. year old who was admitted to [facility]. The patient's principle diagnosis of end stage COPD, has resulted in acute respiratory failure, intractable nausea and vomiting which may have been precipitated by a virus (caregivers had a stomach virus) Functionally, the patient's Palliative Performance Scale has declined over a period of several days and is estimated at 10%. Objective information that support this patients limited prognosis includes: acute respiratory failure, using trilogy and supplemental ventimask. The patient/family chose comfort measures with the support of Hospice.     HOSPICE DIAGNOSES   Active Symptoms:  1. Respiratory failure  2. Tachypnea  3. Severe shortness of breath  4. Use of accessory muscles  5. Altered mental status  6. Intractable nausea and vomiting     PLAN   1. Use trilogy as needed for comfort   2. May use venti mask for comfort  3. Scheduled ativan 1mg q 6hrs and prn  4. Scheduled morphine 1mg q 4hrs and prn  5. Continue Zofran as needed  6.  and SW to support family needs  7.  Disposition: GIP for now, home when symptoms are relieved  8. Continue decadron and nebulizers        Prognosis estimated based on 02/05/17 clinical assessment is:   [x] Few to Many Hours  [] Few to Many Days   [] Few to Many Weeks   [] Few to Many Months     Communicated plan of care with: Hospice Case Manager; Hospice IDT; Care Team     GOALS OF CARE      Resuscitation Status: DNR  Durable DNR: [x] Yes [] No     Advance Care Planning 9/15/2015   Patient's Healthcare Decision Maker is: Named in scanned ACP document   Primary Decision Maker Name [address]   Primary Decision Maker Phone Number [phone]   Primary Decision Maker Relationship to Patient Friend   Secondary Decision Maker Name Jazz Regan  [address] [facility] Phone Number see chart   Secondary Decision Maker Relationship to Patient Other relative   Confirm Advance Directive Yes, on file   Does the patient have other document types Power of          HISTORY      History obtained from: chart, nursing     CHIEF COMPLAINT: unresponsive, acute respiratory failure  The patient is:   [] Verbal  [] Nonverbal  [x] Unresponsive     HPI/SUBJECTIVE: Pt with end stage COPD, respiratory failure and altered mental status        REVIEW OF SYSTEMS      The following systems were: [] reviewed [x] unable to be reviewed     Positive ROS include:  Constitutional:  Ears/nose/mouth/throat:  Respiratory:  Gastrointestinal:  Musculoskeletal:  Neurologic:  Psychiatric:  Endocrine:      Adult Non-Verbal Pain Assessment Score: 6/10     Face  [] 0 No particular expression or smile  [] 1 Occasional grimace, tearing, frowning, wrinkled forehead  [x] 2 Frequent grimace, tearing, frowning, wrinkled forehead     Activity (movement)  [] 0 Lying quietly, normal position  [] 1 Seeking attention through movement or slow, cautious movement  [x] 2 Restless, excessive activity and/or withdrawal reflexes     Guarding  [] 0 Lying quietly, no positioning of hands over areas of body  [] 1 Splinting areas of the body, tense  [x] 2 Rigid, stiff     Physiology (vital signs)  [] 0 Stable vital signs  [] 1 Change in any of the following: SBP > 20mm Hg; HR > 20/minute  [] 2 Change in any of the following: SBP > 30mm Hg; HR > 25/minute     Respiratory  [] 0 Baseline RR/SpO2, compliant with ventilator  [] 1 RR > 10 above baseline, or 5% drop SpO2, mild asynchrony with ventilator  [] 2 RR > 20 above baseline, or 10% drop SpO2, asynchrony with ventilator     FUNCTIONAL ASSESSMENT      Palliative Performance Scale (PPS): 10%     PSYCHOSOCIAL/SPIRITUAL ASSESSMENT      Active Problems:  End stage chronic obstructive pulmonary disease ([facility] (2/4/2017)              Past Medical History   Diagnosis Date    Acute combined systolic and diastolic CHF, NYHA class 3 ([facility] 3/10/2014       TTE 3/9/14 LVEF 25-30 %. Hypo mid-apical anterior, mid-apical inferior, apical septal, and apical wall(s). Mild LVH, DD. RV size upper limits of normal. Systolic function moderately reduced. VIRGEN. Mod MR. Mod AoSt. P.I.G./mean 30/15. Mild to mod AR. Mild TR, moderate to severe PHTN. RVSP at around 60 mm/hg. Inferior vena cava was moderately dilated.  Respirophasic changes in dimension were abse    Asthma      Atrial fibrillation (HCC)      Chronic kidney disease      Colon cancer (HCC) 6/29/10       rt colon ca    Congestive heart failure, unspecified (HCC)      COPD (chronic obstructive pulmonary disease) (HCC)      Cyst of breast      Hypertension      Osteoarthritis      Pneumonia, organism unspecified 3/8/2014    Thrombocytosis ([facility]      Thyroid disease      Vertigo               Past Surgical History   Procedure Laterality Date    Pr lap,surg,colectomy,w/remvl term ileum   7/1/10       BC: rt colon ca    Hx heent           partial thyroidectomy    Hx heent           R cataract removal    Hx appendectomy        Hx gyn           hysterectomy    Hx urological           bladder tacked up           Social History   Substance Use Topics    Smoking status: Never Smoker    Smokeless tobacco: Never Used    Alcohol use No            Family History   Problem Relation Age of Onset    Stroke Mother        No Known Allergies          Current Facility-Administered Medications   Medication Dose Route Frequency    ondansetron (ZOFRAN) injection 4 mg 4 mg IntraVENous Q4H PRN    senna-docusate (PERICOLACE) 8.6-50 mg per tablet 2 Tab 2 Tab Oral BID PRN    albuterol (PROVENTIL VENTOLIN) nebulizer solution 2.5 mg 2.5 mg Nebulization Q6H RT    dexamethasone (DECADRON) 4 mg/mL injection 10 mg 10 mg IntraVENous DAILY    morphine injection 2 mg 2 mg IntraVENous Q1H PRN    LORazepam (ATIVAN) injection 0.5 mg 0.5 mg IntraVENous Q1H PRN    dextrose 5 % - 0.45% NaCl infusion 25 mL/hr IntraVENous CONTINUOUS         PHYSICAL EXAM          Wt Readings from Last 3 Encounters:   09/17/15 47.7 kg (105 lb 3.2 oz)   05/08/15 49.4 kg (109 lb)   07/18/14 51.5 kg (113 lb 9.6 oz)              Visit Vitals    /73 (BP 1 Location: Left arm, BP Patient Position: At rest)    Pulse 90    Temp 98.2 °F (36.8 °C)    Resp 24    SpO2 96%         Supplemental O2  [x] Yes  [] NO  Last bowel movement: Prior to admission     Currently this patient has:  [x] Peripheral IV [] PICC  [] PORT [] ICD    [x] Noel Catheter [] NG Tube   [] PEG Tube    [] Rectal Tube [] Drain  [] Other:      Constitutional: pt is minimally responsive  Eyes: clear   ENMT: clear   Cardiovascular: HRRR  Respiratory: severe shortness of breath  Gastrointestinal: bs+  Musculoskeletal: weakness noted  Skin: warm, dry  Neurologic: calm, minimally responsive  Psychiatric: calm  Other:     Pertinent Lab and or Imaging Tests:        Lab Results   Component Value Date/Time     Sodium 133 09/17/2015 03:29 AM     Potassium 4.5 09/17/2015 03:29 AM     Chloride 95 09/17/2015 03:29 AM     CO2 35 09/17/2015 03:29 AM     Anion gap 3 09/17/2015 03:29 AM     Glucose 101 09/17/2015 03:29 AM   BUN 28 09/17/2015 03:29 AM     Creatinine 1.16 09/17/2015 03:29 AM     BUN/Creatinine ratio 24 09/17/2015 03:29 AM     GFR est AA 53 09/17/2015 03:29 AM     GFR est non-AA 44 09/17/2015 03:29 AM     Calcium 7.9 09/17/2015 03:29 AM            Lab Results   Component Value Date/Time     Protein, total 5.6 09/17/2015 03:29 AM     Albumin 2.6 09/17/2015 03:29 AM              Total time: 54  Linn Carrillo NP              This patient meets [facility] General Inpatient (GIP) Level of Care.     The precipitating event that resulted in the need for GIP was: altered mental status and acute respiratory failure with caregivers unable to administer medications     The interventions tried that have been unsuccessful at controlling symptoms include: oral medications administered at home     Supporting documentation for GIP need for pain control:  [x] Frequent evaluation by a doctor, nurse practitioner, nurse   [x] Frequent medication adjustment   [x] IVs that cannot be administered at home   [] Aggressive pain management   [] Complicated technical delivery of medications              Supporting documentation for GIP need for symptom control:  [x] Sudden decline necessitating intensive nursing intervention  [x] Uncontrolled / intractable nausea or vomiting   [] Pathological fractures  [] Advanced open wounds requiring frequent skilled care  [x] Unmanageable respiratory distress  [] New or worsening delirium   [] Delirium with behavior issues  [] Imminent death  with skilled nursing needs documented above